# Patient Record
Sex: MALE | Race: WHITE | NOT HISPANIC OR LATINO | URBAN - METROPOLITAN AREA
[De-identification: names, ages, dates, MRNs, and addresses within clinical notes are randomized per-mention and may not be internally consistent; named-entity substitution may affect disease eponyms.]

---

## 2019-05-18 ENCOUNTER — EMERGENCY (EMERGENCY)
Facility: HOSPITAL | Age: 24
LOS: 1 days | Discharge: HOME | End: 2019-05-18
Attending: EMERGENCY MEDICINE | Admitting: EMERGENCY MEDICINE
Payer: COMMERCIAL

## 2019-05-18 VITALS
RESPIRATION RATE: 16 BRPM | OXYGEN SATURATION: 99 % | WEIGHT: 225.09 LBS | HEIGHT: 69.5 IN | DIASTOLIC BLOOD PRESSURE: 85 MMHG | HEART RATE: 60 BPM | SYSTOLIC BLOOD PRESSURE: 159 MMHG | TEMPERATURE: 98 F

## 2019-05-18 PROCEDURE — 99284 EMERGENCY DEPT VISIT MOD MDM: CPT

## 2019-05-18 RX ORDER — DIPHENHYDRAMINE HCL 50 MG
50 CAPSULE ORAL ONCE
Refills: 0 | Status: COMPLETED | OUTPATIENT
Start: 2019-05-18 | End: 2019-05-18

## 2019-05-18 RX ORDER — OFLOXACIN 0.3 %
1 DROPS OPHTHALMIC (EYE)
Qty: 5 | Refills: 0
Start: 2019-05-18 | End: 2019-05-22

## 2019-05-18 RX ORDER — DIPHENHYDRAMINE HCL 50 MG
2 CAPSULE ORAL
Qty: 30 | Refills: 0
Start: 2019-05-18 | End: 2019-05-22

## 2019-05-18 RX ADMIN — Medication 50 MILLIGRAM(S): at 16:58

## 2019-05-18 NOTE — ED PROVIDER NOTE - CARE PROVIDER_API CALL
Christian Wagner)  Ophthalmology  [address] [place] [address]  Phone: [phone]  Fax: [phone]  Follow Up Time: 1-3 Days    Juju Dubon)  Internal Medicine  [address] [place] [address]  Phone: [phone]  Fax: [phone]  Follow Up Time: 1-3 Days

## 2019-05-18 NOTE — ED PROVIDER NOTE - CHPI ED SYMPTOMS NEG
no difficulty breathing/no shortness of breath/no swelling of face, tongue/no difficulty swallowing/no vomiting/no nausea/no throat itching/no wheezing/no cough

## 2019-05-18 NOTE — ED PROVIDER NOTE - PROVIDER TOKENS
PROVIDER:[TOKEN:[[id number]],FOLLOWUP:[1-3 Days]],PROVIDER:[TOKEN:[[id number]],FOLLOWUP:[1-3 Days]]

## 2019-05-18 NOTE — ED PROVIDER NOTE - OBJECTIVE STATEMENT
pt bib ems from [facility] for rash and itching to face s/p applying new brand of sunscreen to that area. no fevers, chills, ha, d/n/v, cp, sob, cough, wheezing, abd pain, diff breathing or swallowing, swelling of mouth, lips, throat or tongue.  pmd - none pt bib ems from [facility] for rash and itching to face s/p applying new brand of sunscreen to that area. pt also c/o erythema to eyes, thinks he got small amt of sunscreen in eyes. pt reports eye irrigated with saline at [facility]. no fevers, chills, ha, d/n/v, cp, sob, cough, wheezing, abd pain, diff breathing or swallowing, swelling of mouth, lips, throat or tongue.  pmd - none

## 2019-05-18 NOTE — ED PROVIDER NOTE - CLINICAL SUMMARY MEDICAL DECISION MAKING FREE TEXT BOX
pt bib ems from [facility] for rash and itching to face s/p applying new brand of sunscreen to that area - benadryl/prednisone

## 2019-05-18 NOTE — ED ADULT NURSE NOTE - CHPI ED NUR SYMPTOMS NEG
no difficulty swallowing/no wheezing/no difficulty breathing/no shortness of breath/no swelling of face, tongue

## 2019-05-18 NOTE — ED ADULT NURSE NOTE - NSIMPLEMENTINTERV_GEN_ALL_ED
Implemented All Universal Safety Interventions:  Guy to call system. Call bell, personal items and telephone within reach. Instruct patient to call for assistance. Room bathroom lighting operational. Non-slip footwear when patient is off stretcher. Physically safe environment: no spills, clutter or unnecessary equipment. Stretcher in lowest position, wheels locked, appropriate side rails in place.

## 2019-05-18 NOTE — ED PROVIDER NOTE - CARE PROVIDERS DIRECT ADDRESSES
,DirectAddress_Unknown,[email or web address]

## 2019-05-18 NOTE — ED ADULT NURSE NOTE - OBJECTIVE STATEMENT
Patient states he applied a sunscreen to his face that he has not used before and had, he believes, an adverse reaction, bilateral eye redness, pain, tearing, lips swollen, no difficulty breathing or swallowing

## 2019-05-18 NOTE — ED PROVIDER NOTE - CARE PLAN
Principal Discharge DX:	Allergic reaction, initial encounter  Secondary Diagnosis:	Chemical conjunctivitis of both eyes

## 2019-05-18 NOTE — ED PROVIDER NOTE - ENMT, MLM
Airway patent. Mouth with normal mucosa. Throat has no vesicles, no oropharyngeal exudates and uvula is midline. No swelling of lips, mouth, tongue or throat